# Patient Record
Sex: MALE | Race: WHITE | NOT HISPANIC OR LATINO | URBAN - NONMETROPOLITAN AREA
[De-identification: names, ages, dates, MRNs, and addresses within clinical notes are randomized per-mention and may not be internally consistent; named-entity substitution may affect disease eponyms.]

---

## 2018-10-29 PROBLEM — H52.223: Noted: 2018-10-29

## 2018-10-29 PROBLEM — H52.13: Noted: 2018-10-29

## 2020-09-02 ENCOUNTER — IMPORTED ENCOUNTER (OUTPATIENT)
Dept: URBAN - NONMETROPOLITAN AREA CLINIC 1 | Facility: CLINIC | Age: 35
End: 2020-09-02

## 2020-09-02 PROCEDURE — 92004 COMPRE OPH EXAM NEW PT 1/>: CPT

## 2020-09-02 NOTE — PATIENT DISCUSSION
*LASIK:-  Risks and Benefits discussed with patient including infection bleeding loss of vision flap complications mechanical failure and over and under correction.  The entire procedure was explained to the patient from start to finish and all pts questions were answered.-Pt desires to proceed with surgery and understands realistic expectations.-Schirmers Test done today: 26 OD and 31 OS -Cyclo MR done by Dr. 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 Roberto Nathan to schedule LASIK

## 2020-10-07 ENCOUNTER — IMPORTED ENCOUNTER (OUTPATIENT)
Dept: URBAN - NONMETROPOLITAN AREA CLINIC 1 | Facility: CLINIC | Age: 35
End: 2020-10-07

## 2020-10-07 NOTE — PATIENT DISCUSSION
LASIK  Surgery OU Today-	Patient came in and read the all LASIK surgery consents thoroughly initialing the bottom of each page after read. -	Post op instructions given and reviewed with patient-	Post op drop instructions given and reviewed with patient -	Patient voiced understanding of drop instructions and post op instructions. -	After all the above was preformed patient was asked if he has any question before we proceed and patient denied.  -	Patient then signed the consent forms -	Procedure was preformed without complications -	Patient is to come back in one day for POV and then one week W/ Dr. Samantha Kwon

## 2020-10-08 ENCOUNTER — IMPORTED ENCOUNTER (OUTPATIENT)
Dept: URBAN - NONMETROPOLITAN AREA CLINIC 1 | Facility: CLINIC | Age: 35
End: 2020-10-08

## 2020-10-08 PROBLEM — H52.13: Noted: 2020-10-08

## 2020-10-08 PROBLEM — Z98.890: Noted: 2020-10-08

## 2020-10-08 PROBLEM — H52.223: Noted: 2020-10-08

## 2020-10-08 PROCEDURE — 99024 POSTOP FOLLOW-UP VISIT: CPT

## 2020-10-15 ENCOUNTER — IMPORTED ENCOUNTER (OUTPATIENT)
Dept: URBAN - NONMETROPOLITAN AREA CLINIC 1 | Facility: CLINIC | Age: 35
End: 2020-10-15

## 2020-10-15 PROCEDURE — 99024 POSTOP FOLLOW-UP VISIT: CPT

## 2020-11-18 ENCOUNTER — IMPORTED ENCOUNTER (OUTPATIENT)
Dept: URBAN - NONMETROPOLITAN AREA CLINIC 1 | Facility: CLINIC | Age: 35
End: 2020-11-18

## 2020-11-18 PROCEDURE — 99024 POSTOP FOLLOW-UP VISIT: CPT

## 2020-11-18 NOTE — PATIENT DISCUSSION
s/p Lasik 1 Month Bilateral 10/7/20Flap intact OU and stable with clear vision OUContinue Pred QD OU x 1 week then d/c Continue AT F/U with Tristian Allan in 3 months

## 2022-04-10 ASSESSMENT — KERATOMETRY
OD_K1POWER_DIOPTERS: 42.88
OD_AXISANGLE_DEGREES: 018
OS_K2POWER_DIOPTERS: 45.00
OS_AXISANGLE_DEGREES: 143
OD_K2POWER_DIOPTERS: 44.06
OS_K1POWER_DIOPTERS: 43.83

## 2022-04-10 ASSESSMENT — TONOMETRY
OD_IOP_MMHG: 15
OS_IOP_MMHG: 15

## 2022-04-10 ASSESSMENT — VISUAL ACUITY
OD_CC: 20/20
OD_CC: 20/20
OS_CC: 20/100
OS_CC: 20/20
OD_SC: 20/20
OS_SC: 20/20
OD_CC: 20/20